# Patient Record
Sex: FEMALE | Race: WHITE | Employment: STUDENT | URBAN - METROPOLITAN AREA
[De-identification: names, ages, dates, MRNs, and addresses within clinical notes are randomized per-mention and may not be internally consistent; named-entity substitution may affect disease eponyms.]

---
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
## 2020-01-18 ENCOUNTER — APPOINTMENT (OUTPATIENT)
Dept: GENERAL RADIOLOGY | Facility: CLINIC | Age: 20
End: 2020-01-18
Attending: PHYSICIAN ASSISTANT
Payer: COMMERCIAL

## 2020-01-18 ENCOUNTER — HOSPITAL ENCOUNTER (EMERGENCY)
Facility: CLINIC | Age: 20
Discharge: HOME | End: 2020-01-18
Attending: PHYSICIAN ASSISTANT | Admitting: PHYSICIAN ASSISTANT
Payer: COMMERCIAL

## 2020-01-18 VITALS
OXYGEN SATURATION: 99 % | DIASTOLIC BLOOD PRESSURE: 85 MMHG | SYSTOLIC BLOOD PRESSURE: 129 MMHG | RESPIRATION RATE: 22 BRPM | TEMPERATURE: 96 F | HEART RATE: 70 BPM

## 2020-01-18 DIAGNOSIS — R07.9 CHEST PAIN: ICD-10-CM

## 2020-01-18 DIAGNOSIS — R06.02 SHORTNESS OF BREATH: ICD-10-CM

## 2020-01-18 PROCEDURE — 71046 X-RAY EXAM CHEST 2 VIEWS: CPT

## 2020-01-18 PROCEDURE — 93005 ELECTROCARDIOGRAM TRACING: CPT

## 2020-01-18 PROCEDURE — 99284 EMERGENCY DEPT VISIT MOD MDM: CPT | Mod: 25

## 2020-01-18 RX ORDER — HYDROXYZINE PAMOATE 50 MG/1
50 CAPSULE ORAL 3 TIMES DAILY PRN
Qty: 12 CAPSULE | Refills: 0 | Status: SHIPPED | OUTPATIENT
Start: 2020-01-18

## 2020-01-18 ASSESSMENT — ENCOUNTER SYMPTOMS
RHINORRHEA: 0
SORE THROAT: 0
BACK PAIN: 1
CHEST TIGHTNESS: 1
SHORTNESS OF BREATH: 1
DIARRHEA: 0
FEVER: 0
COUGH: 0
WHEEZING: 0
VOMITING: 0

## 2020-01-18 NOTE — ED AVS SNAPSHOT
[facility] Emergency Department  [address]  [facility] [address]  Phone:  [phone]  Fax:  [phone]                                    Marlene Talavera   MRN: [id number]    Department:  [facility] Emergency Department   Date of Visit:  1/18/2020           After Visit Summary Signature Page    I have received my discharge instructions, and my questions have been answered. I have discussed any challenges I see with this plan with the nurse or doctor.    ..........................................................................................................................................  Patient/Patient Representative Signature      ..........................................................................................................................................  Patient Representative Print Name and Relationship to Patient    ..................................................               ................................................  Date                                   Time    ..........................................................................................................................................  Reviewed by Signature/Title    ...................................................              ..............................................  Date                                               Time          22EPIC Rev 08/18

## 2020-01-18 NOTE — ED PROVIDER NOTES
"  History     Chief Complaint:  Shortness of Breath    The history is provided by the patient.      Marlene Talavera is a 19 year old female with a history of asthma and anxiety who presents to the emergency department today for evaluation of shortness of breath. The patient reports that three evenings ago (01/15) she developed a tightness in her chest that was associated with shortness of breath. She states that since then she has been having intermittent episodes of chest tightness and shortness of breath that come on randomly and usually last approximately fifteen minutes. The patient notes she has been more short of breath with walking. She describes the chest pain as a squeezing in her chest and likens it to something sitting on her chest which makes her feel short of breath as if she were \"not getting enough air in and out\". She also endorses pain in her upper back associated with the episodes. The patient states this does not feel like her asthma and adds that she has had episodes of cold sweats since onset as well. Currently, she is experiencing some shortness of breath but states it is not at its worst. Her mother adds that the patient has been more lethargic than usual and has been under increased stress over the past few weeks. The patient does use birth control. She has not had any fevers, cough, wheezing, runny nose, or a sore throat. The patient further states she has had no vomiting, diarrhea, leg pain, or leg swelling.       Cardiac/PE/DVT Risk Factors:  History of hypertension - Negative   History of hyperlipidemia - Negative   History of diabetes - Negative   History of smoking - Negative   Personal history of PE/DVT - Negative   Family history of PE/DVT - Negative   Family history of heart complications - Negative   Recent travel - Negative   Recent surgery - Negative   Other immobilizations - Negative   Cancer - Negative    Hormone use- Positive       Allergies:  Amoxicillin     Medications:  "   Albuterol sulfate inhaler  Fluoxetine   Verapamil  Adderall   Tri femynor    Past Medical History:    Concussion  Migraines  Hypoglycemia   Anxiety   Asthma     Past Surgical History:    Cyst removal    Family History:    Allergies  Migraines  Seizures   Asthma     Social History:  The patient was accompanied to the ED by her mother.  Smoking Status: Never Smoker  Marital Status:  Single     Review of Systems   Constitutional: Negative for fever.        Cold sweats  Lethargy    HENT: Negative for rhinorrhea and sore throat.    Respiratory: Positive for chest tightness and shortness of breath. Negative for cough and wheezing.    Cardiovascular: Negative for leg swelling.   Gastrointestinal: Negative for diarrhea and vomiting.   Musculoskeletal: Positive for back pain.        No leg pain   All other systems reviewed and are negative.    Physical Exam     Patient Vitals for the past 24 hrs:   BP Temp Temp src Pulse Heart Rate Resp SpO2   01/18/20 1300 -- -- -- -- -- -- 100 %   01/18/20 1245 -- -- -- -- -- -- 100 %   01/18/20 1230 -- -- -- -- -- -- 100 %   01/18/20 1215 133/74 96  F (35.6  C) Temporal 86 86 22 100 %      Physical Exam  Vitals signs and nursing note reviewed.   Constitutional:       General: She is not in acute distress.     Appearance: She is not diaphoretic.   HENT:      Head: Atraumatic.      Mouth/Throat:      Pharynx: No oropharyngeal exudate.   Eyes:      General: No scleral icterus.     Pupils: Pupils are equal, round, and reactive to light.   Cardiovascular:      Rate and Rhythm: Normal rate and regular rhythm.      Pulses: Normal pulses.      Heart sounds: Normal heart sounds.   Pulmonary:      Effort: Pulmonary effort is normal. No respiratory distress.      Breath sounds: Normal breath sounds.      Comments: SpO2 100% on room air interpreted by me to be normal  Musculoskeletal:         General: No tenderness.      Right lower leg: No edema.      Left lower leg: No edema.   Skin:     General:  Skin is warm.      Findings: No rash.   Neurological:      Mental Status: She is alert.       Emergency Department Course     ECG:  ECG taken at 1221, ECG read at 1255  Normal sinus rhythm   Normal ECG   Rate 90 bpm. RI interval 134. QRS duration 82. QT/QTc 356/435. P-R-T axes 65 76 12.     Imaging:  Radiology findings were communicated with the patient and family who voiced understanding of the findings.  XR, Chest, PA & LAT  Negative exam.  Reading per radiology      Emergency Department Course:  1219 Nursing notes and vitals reviewed.  1221 An ECG was performed, results above.    1237 I performed an exam of the patient as documented above.  1317 The patient was sent for a chest x-ray while in the emergency department, results above.     1346 I checked on the patient and updated her and her mother with results and treatment plan.     Findings and plan explained to the Patient and mother. Patient discharged home with instructions regarding supportive care, medications, and reasons to return. The importance of close follow-up was reviewed. The patient was prescribed Hydroxyzine.      I personally reviewed the imaging results with the Patient and mother and answered all related questions prior to discharge.     Impression & Plan      Medical Decision Making:  She presents for evaluation of chest pain and SOB. She and her mother feel this is likely 2/2 anxiety but would like to rule out alternative processes. Here she is without hypoxemia, increased respiratory effort, or adventitious lung sounds. Clinically she is not most c/w asthma exacerbation at this time. CXR obtained without findings of pneumonia, pneumothorax, pulmonary edema/CHF, cardiomegaly. EKG without ischemic changes, Brugada pattern, arrhythmia. She is Wells low risk, SpO2 100%, no increased respiratory effort, and does not appear clinically most c/w pulmonary embolism. She denies symptoms of GI bleeding, no history of anemia, and clinically does not  appear most c/w symptomatic anemia. There are no family history of congenital heart diseases or complications. She appears candidate for outpatient follow up    Diagnosis:    ICD-10-CM    1. Chest pain R07.9    2. Shortness of breath R06.02        Disposition:  The patient is discharged to home.     Discharge Medications:  New Prescriptions    HYDROXYZINE (VISTARIL) 50 MG CAPSULE    Take 1 capsule (50 mg) by mouth 3 times daily as needed for anxiety       Scribe Disclosure:  I, Yancy Hodges, am serving as a scribe at 12:20 PM on 1/18/2020 to document services personally performed by Matt Doan PA-C based on my observations and the provider's statements to me.    1/18/2020   [facility] EMERGENCY DEPARTMENT       Matt Doan PA-C  01/18/20 [id number]

## 2020-01-19 LAB — INTERPRETATION ECG - MUSE: NORMAL

## 2020-12-08 NOTE — ED TRIAGE NOTES
Pt in with increased SOB since Wednesday, states hx of exercise induced asthma but increasing trouble breathing at rest. Pt using inhaler at home with minimal relief. Denies cough or fevers. ABC's intact, alert and oriented X3.   
08-Dec-2020 12:21
Yes